# Patient Record
(demographics unavailable — no encounter records)

---

## 2025-01-13 NOTE — DISCUSSION/SUMMARY
[de-identified] : General Dx discussion The patient was advised of the diagnosis. The natural history of the pathology was explained in full to the patient in layman's terms. All questions were answered. The risks and benefits of surgical and non-surgical treatment alternatives were explained in full to the patient.   Case discussed. Options including NSAIDs, cortisone injection, visco injections, PT, and surgery discussed. The patient decided to proceed with visco injections. Orthovisc tolerated well. Ice as needed. Follow up in 1 week to continue the series.    Entered by CHU Solis acting as scribe. - The documentation recorded by the scribe accurately reflects the service I personally performed and the decisions made by me.

## 2025-01-13 NOTE — PROCEDURE
[Large Joint Injection] : Large joint injection [Right] : of the right [Knee] : knee [Pain] : pain [X-ray evidence of Osteoarthritis on this or prior visit] : x-ray evidence of Osteoarthritis on this or prior visit [Alcohol] : alcohol [Betadine] : betadine [Ethyl Chloride sprayed topically] : ethyl chloride sprayed topically [Sterile technique used] : sterile technique used [Orthovisc (30mg)] : 30mg of Orthovisc [#1] : series #1 [] : Patient tolerated procedure well [Call if redness, pain or fever occur] : call if redness, pain or fever occur [Apply ice for 15min out of every hour for the next 12-24 hours as tolerated] : apply ice for 15 minutes out of every hour for the next 12-24 hours as tolerated [Previous OTC use and PT nontherapeutic] : patient has tried OTC's including aspirin, Ibuprofen, Aleve, etc or prescription NSAIDS, and/or exercises at home and/or physical therapy without satisfactory response [Patient had decreased mobility in the joint] : patient had decreased mobility in the joint [Risks, benefits, alternatives discussed / Verbal consent obtained] : the risks benefits, and alternatives have been discussed, and verbal consent was obtained [Altered anatomic landmarks d/t erosive arthritis] : altered anatomic landmarks d/t erosive arthritis [All ultrasound images have been permanently captured and stored accordingly in our picture archiving and communication system] : All ultrasound images have been permanently captured and stored accordingly in our picture archiving and communication system [Visualization of the needle and placement of injection was performed without complication] : visualization of the needle and placement of injection was performed without complication

## 2025-01-13 NOTE — HISTORY OF PRESENT ILLNESS
[4] : 4 [3] : 3 [Dull/Aching] : dull/aching [Intermittent] : intermittent [Leisure] : leisure [Rest] : rest [Heat] : heat [Walking] : walking [Retired] : Work status: retired [de-identified] : Right knee pain for a few weeks that is worsening. No known injury.  [] : no [FreeTextEntry1] : Right knee [FreeTextEntry9] : Tylenol

## 2025-01-13 NOTE — PHYSICAL EXAM
[Right] : right knee [] : no pain with varus stress [TWNoteComboBox7] : flexion 100 degrees [de-identified] : extension 3 degrees

## 2025-01-23 NOTE — PHYSICAL EXAM
[Right] : right knee [] : no pain with varus stress [TWNoteComboBox7] : flexion 100 degrees [de-identified] : extension 3 degrees

## 2025-01-23 NOTE — HISTORY OF PRESENT ILLNESS
[2] : 2 [Orthovisc] : Orthovisc [de-identified] : Patient is here for a follow up on his right knee. [] : no [de-identified] : 1/13/2025

## 2025-01-23 NOTE — DISCUSSION/SUMMARY
[de-identified] : General Dx discussion The patient was advised of the diagnosis. The natural history of the pathology was explained in full to the patient in layman's terms. All questions were answered. The risks and benefits of surgical and non-surgical treatment alternatives were explained in full to the patient.   Case discussed. Options including NSAIDs, cortisone injection, visco injections, PT, and surgery discussed. The patient decided to proceed with visco injections. Orthovisc tolerated well. Ice as needed. Follow up in 1 week to continue the series.    Entered by Delmy SAGE acting as scribe. - The documentation recorded by the scribe accurately reflects the service I personally performed and the decisions made by me.

## 2025-01-23 NOTE — PROCEDURE
[Large Joint Injection] : Large joint injection [Right] : of the right [Knee] : knee [Pain] : pain [X-ray evidence of Osteoarthritis on this or prior visit] : x-ray evidence of Osteoarthritis on this or prior visit [Alcohol] : alcohol [Betadine] : betadine [Ethyl Chloride sprayed topically] : ethyl chloride sprayed topically [Sterile technique used] : sterile technique used [Orthovisc (30mg)] : 30mg of Orthovisc [#2] : series #2 [] : Patient tolerated procedure well [Call if redness, pain or fever occur] : call if redness, pain or fever occur [Apply ice for 15min out of every hour for the next 12-24 hours as tolerated] : apply ice for 15 minutes out of every hour for the next 12-24 hours as tolerated [Previous OTC use and PT nontherapeutic] : patient has tried OTC's including aspirin, Ibuprofen, Aleve, etc or prescription NSAIDS, and/or exercises at home and/or physical therapy without satisfactory response [Patient had decreased mobility in the joint] : patient had decreased mobility in the joint [Risks, benefits, alternatives discussed / Verbal consent obtained] : the risks benefits, and alternatives have been discussed, and verbal consent was obtained [Altered anatomic landmarks d/t erosive arthritis] : altered anatomic landmarks d/t erosive arthritis [All ultrasound images have been permanently captured and stored accordingly in our picture archiving and communication system] : All ultrasound images have been permanently captured and stored accordingly in our picture archiving and communication system [Visualization of the needle and placement of injection was performed without complication] : visualization of the needle and placement of injection was performed without complication

## 2025-01-30 NOTE — HISTORY OF PRESENT ILLNESS
[3] : 3 [Orthovisc] : Orthovisc [de-identified] : Patient is here for a follow up on his right knee. [] : no [FreeTextEntry1] : right knee [de-identified] : 1/23/2025 [TWNoteComboBox1] : 10%

## 2025-01-30 NOTE — DISCUSSION/SUMMARY
[de-identified] : General Dx discussion The patient was advised of the diagnosis. The natural history of the pathology was explained in full to the patient in layman's terms. All questions were answered. The risks and benefits of surgical and non-surgical treatment alternatives were explained in full to the patient.   Case discussed. Orthovisc tolerated well. Ice as needed, Follow up in 1 week to continue the series.    Entered by CHU Solis acting as scribe. - The documentation recorded by the scribe accurately reflects the service I personally performed and the decisions made by me.

## 2025-01-30 NOTE — PHYSICAL EXAM
[Right] : right knee [] : no pain with varus stress [TWNoteComboBox7] : flexion 100 degrees [de-identified] : extension 3 degrees

## 2025-01-30 NOTE — PROCEDURE
[Large Joint Injection] : Large joint injection [Right] : of the right [Knee] : knee [Pain] : pain [X-ray evidence of Osteoarthritis on this or prior visit] : x-ray evidence of Osteoarthritis on this or prior visit [Alcohol] : alcohol [Betadine] : betadine [Ethyl Chloride sprayed topically] : ethyl chloride sprayed topically [Sterile technique used] : sterile technique used [Orthovisc (30mg)] : 30mg of Orthovisc [] : Patient tolerated procedure well [Call if redness, pain or fever occur] : call if redness, pain or fever occur [Apply ice for 15min out of every hour for the next 12-24 hours as tolerated] : apply ice for 15 minutes out of every hour for the next 12-24 hours as tolerated [Previous OTC use and PT nontherapeutic] : patient has tried OTC's including aspirin, Ibuprofen, Aleve, etc or prescription NSAIDS, and/or exercises at home and/or physical therapy without satisfactory response [Patient had decreased mobility in the joint] : patient had decreased mobility in the joint [Risks, benefits, alternatives discussed / Verbal consent obtained] : the risks benefits, and alternatives have been discussed, and verbal consent was obtained [Altered anatomic landmarks d/t erosive arthritis] : altered anatomic landmarks d/t erosive arthritis [All ultrasound images have been permanently captured and stored accordingly in our picture archiving and communication system] : All ultrasound images have been permanently captured and stored accordingly in our picture archiving and communication system [Visualization of the needle and placement of injection was performed without complication] : visualization of the needle and placement of injection was performed without complication [#3] : series #3

## 2025-02-06 NOTE — DISCUSSION/SUMMARY
[de-identified] : General Dx discussion The patient was advised of the diagnosis. The natural history of the pathology was explained in full to the patient in layman's terms. All questions were answered. The risks and benefits of surgical and non-surgical treatment alternatives were explained in full to the patient.   Case discussed. Orthovisc tolerated well. Ice as needed, Follow up in 6 weeks.     Entered by CHU Rebolledo acting as scribe. - The documentation recorded by the scribe accurately reflects the service I personally performed and the decisions made by me.

## 2025-02-06 NOTE — PHYSICAL EXAM
[Right] : right knee [] : no pain with varus stress [TWNoteComboBox7] : flexion 100 degrees [de-identified] : extension 3 degrees

## 2025-02-06 NOTE — PROCEDURE
[Large Joint Injection] : Large joint injection [Right] : of the right [Knee] : knee [Pain] : pain [X-ray evidence of Osteoarthritis on this or prior visit] : x-ray evidence of Osteoarthritis on this or prior visit [Alcohol] : alcohol [Betadine] : betadine [Ethyl Chloride sprayed topically] : ethyl chloride sprayed topically [Sterile technique used] : sterile technique used [Orthovisc (30mg)] : 30mg of Orthovisc [] : Patient tolerated procedure well [Call if redness, pain or fever occur] : call if redness, pain or fever occur [Apply ice for 15min out of every hour for the next 12-24 hours as tolerated] : apply ice for 15 minutes out of every hour for the next 12-24 hours as tolerated [Previous OTC use and PT nontherapeutic] : patient has tried OTC's including aspirin, Ibuprofen, Aleve, etc or prescription NSAIDS, and/or exercises at home and/or physical therapy without satisfactory response [Patient had decreased mobility in the joint] : patient had decreased mobility in the joint [Risks, benefits, alternatives discussed / Verbal consent obtained] : the risks benefits, and alternatives have been discussed, and verbal consent was obtained [Altered anatomic landmarks d/t erosive arthritis] : altered anatomic landmarks d/t erosive arthritis [All ultrasound images have been permanently captured and stored accordingly in our picture archiving and communication system] : All ultrasound images have been permanently captured and stored accordingly in our picture archiving and communication system [Visualization of the needle and placement of injection was performed without complication] : visualization of the needle and placement of injection was performed without complication [#4] : series #4

## 2025-02-06 NOTE — HISTORY OF PRESENT ILLNESS
[4] : 4 [Orthovisc] : Orthovisc [de-identified] :  02/06/2025: Patient is here for Orthovisc #4 today. [] : no [de-identified] : 1/30/25 [TWNoteComboBox1] : 50%